# Patient Record
(demographics unavailable — no encounter records)

---

## 2025-05-02 NOTE — IMAGING
[Left] : left hip with pelvis [Severe arthritis (Tonnis Grade 3)] : Severe arthritis (Tonnis Grade 3) [de-identified] : LEFT HIP + impingement loss of IR and ER good pulses (-) SLR 5/5 strength NVI

## 2025-05-02 NOTE — HISTORY OF PRESENT ILLNESS
[Dull/Aching] : dull/aching [de-identified] : 5/2/25: 65yo F returns with left hip pain that increased last month after Pass[place]. States she had done HEP and Aleve with some relief. States pain is manageable today  Prev Doc: 8/1/23:  62F with LT Hip pain x1 year worsening in the last 3 months. No specific injury/trauma. Pain in groin area, not much lateral pain. Pain worsens with physical activity, prolonged sitting and going from sit to stand. Takes advil with good relief. Pt notes some numbness in lower extremities when sitting for long period of time. Ambulates freely.  [FreeTextEntry5] : has seen Dr Francisco in the past 2023. pain returned ~3/2025. pain has been radiating down the leg

## 2025-05-02 NOTE — ASSESSMENT
[FreeTextEntry1] : 8/1/23: Mod OA of LT hip. Discussed progression of OA and conservative tx options including HEP, PT, anti-inflammatories, csi and HA inj. Pt will begin HEP and take OTC/NSAIDs as needed. Follow up prn.   5/2/25: Adv L hip OA with progression noted from previous XR. She is able to manage her symptoms with Aleve and HEP. States she is able to walk ~1hr every morning without difficulty. Addressed her questions and concerns; she does not feel ready for CSI or NICOLASA at this point. PT rx provided for her. f/up prn

## 2025-05-02 NOTE — DISCUSSION/SUMMARY
[de-identified] : The natural progression of Osteoarthritis was explained to the patient.  We discussed the possible treatment options from conservative to operative.  These included NSAIDS, Glucosamine and Chondrotin sulfate, and Physical Therapy as well different types of injections.  We also discussed that at some point they may progress to needed a TKA.  Information and pamphlets were given when appropriate.  Progress Note completed by Leanne Rodgers PA-C * Dr. Francisco -- The documentation recorded in this note accurately reflects the decisions made by me during this visit.  I interviewed and examined the patient personally and oversaw all medical decisions.